# Patient Record
Sex: MALE | Race: WHITE | NOT HISPANIC OR LATINO | URBAN - METROPOLITAN AREA
[De-identification: names, ages, dates, MRNs, and addresses within clinical notes are randomized per-mention and may not be internally consistent; named-entity substitution may affect disease eponyms.]

---

## 2023-04-24 ENCOUNTER — APPOINTMENT (OUTPATIENT)
Dept: URBAN - METROPOLITAN AREA CLINIC 193 | Age: 48
Setting detail: DERMATOLOGY
End: 2023-04-25

## 2023-04-24 DIAGNOSIS — L29.8 OTHER PRURITUS: ICD-10-CM

## 2023-04-24 DIAGNOSIS — L23.9 ALLERGIC CONTACT DERMATITIS, UNSPECIFIED CAUSE: ICD-10-CM

## 2023-04-24 DIAGNOSIS — L30.4 ERYTHEMA INTERTRIGO: ICD-10-CM

## 2023-04-24 PROCEDURE — OTHER COUNSELING: OTHER

## 2023-04-24 PROCEDURE — 99213 OFFICE O/P EST LOW 20 MIN: CPT

## 2023-04-24 PROCEDURE — OTHER PRESCRIPTION: OTHER

## 2023-04-24 PROCEDURE — OTHER ADDITIONAL NOTES: OTHER

## 2023-04-24 RX ORDER — CICLOPIROX OLAMINE 7.7 MG/G
0.77 CREAM TOPICAL BID
Qty: 30 | Refills: 1 | Status: ERX | COMMUNITY
Start: 2023-04-24

## 2023-04-24 RX ORDER — TRIAMCINOLONE ACETONIDE 1 MG/G
0.1 OINTMENT TOPICAL
Qty: 80 | Refills: 1 | Status: ERX | COMMUNITY
Start: 2023-04-24

## 2023-04-24 ASSESSMENT — SEVERITY ASSESSMENT 2020: SEVERITY 2020: MILD

## 2023-04-24 ASSESSMENT — LOCATION ZONE DERM
LOCATION ZONE: ARM
LOCATION ZONE: TRUNK

## 2023-04-24 ASSESSMENT — LOCATION SIMPLE DESCRIPTION DERM
LOCATION SIMPLE: LEFT FOREARM
LOCATION SIMPLE: RIGHT FOREARM
LOCATION SIMPLE: ABDOMEN

## 2023-04-24 ASSESSMENT — BSA RASH: BSA RASH: 6

## 2023-04-24 ASSESSMENT — LOCATION DETAILED DESCRIPTION DERM
LOCATION DETAILED: LEFT VENTRAL DISTAL FOREARM
LOCATION DETAILED: PERIUMBILICAL SKIN
LOCATION DETAILED: RIGHT VENTRAL DISTAL FOREARM

## 2023-04-24 ASSESSMENT — ITCH NUMERIC RATING SCALE: HOW SEVERE IS YOUR ITCHING?: 4

## 2023-04-24 NOTE — HPI: RASH
What Type Of Note Output Would You Prefer (Optional)?: Standard Output
Is This A New Presentation, Or A Follow-Up?: Rash
Additional History: Patient has been treating the area with OTC, Anti-fungal, with no improvement.

## 2023-04-24 NOTE — HPI: SECONDARY COMPLAINT
Additional History: Patient states there has been no treatment. He believes it maybe caused by something he is contact with at this job. Notes the rash comes and goes.

## 2023-04-24 NOTE — PROCEDURE: ADDITIONAL NOTES
Additional Notes: Suggested patch testing to help identify allergen
Render Risk Assessment In Note?: no
Detail Level: Simple